# Patient Record
Sex: FEMALE | Race: WHITE | HISPANIC OR LATINO
[De-identification: names, ages, dates, MRNs, and addresses within clinical notes are randomized per-mention and may not be internally consistent; named-entity substitution may affect disease eponyms.]

---

## 2018-02-26 ENCOUNTER — LABORATORY RESULT (OUTPATIENT)
Age: 28
End: 2018-02-26

## 2018-02-27 ENCOUNTER — APPOINTMENT (OUTPATIENT)
Dept: OBGYN | Facility: CLINIC | Age: 28
End: 2018-02-27
Payer: MEDICAID

## 2018-02-27 ENCOUNTER — ASOB RESULT (OUTPATIENT)
Age: 28
End: 2018-02-27

## 2018-02-27 VITALS — DIASTOLIC BLOOD PRESSURE: 70 MMHG | WEIGHT: 151 LBS | SYSTOLIC BLOOD PRESSURE: 110 MMHG

## 2018-02-27 PROCEDURE — 99395 PREV VISIT EST AGE 18-39: CPT

## 2018-02-27 PROCEDURE — 76830 TRANSVAGINAL US NON-OB: CPT

## 2020-10-13 ENCOUNTER — LABORATORY RESULT (OUTPATIENT)
Age: 30
End: 2020-10-13

## 2020-10-14 ENCOUNTER — APPOINTMENT (OUTPATIENT)
Dept: OBGYN | Facility: CLINIC | Age: 30
End: 2020-10-14
Payer: COMMERCIAL

## 2020-10-14 VITALS — SYSTOLIC BLOOD PRESSURE: 110 MMHG | DIASTOLIC BLOOD PRESSURE: 70 MMHG | WEIGHT: 134 LBS

## 2020-10-14 PROCEDURE — 99395 PREV VISIT EST AGE 18-39: CPT

## 2020-10-14 NOTE — PHYSICAL EXAM
[Appropriately responsive] : appropriately responsive [Alert] : alert [No Acute Distress] : no acute distress [Regular Rate Rhythm] : regular rate rhythm [No Lymphadenopathy] : no lymphadenopathy [No Murmurs] : no murmurs [Clear to Auscultation B/L] : clear to auscultation bilaterally [Non-tender] : non-tender [Soft] : soft [No HSM] : No HSM [Non-distended] : non-distended [No Mass] : no mass [No Lesions] : no lesions [Oriented x3] : oriented x3 [Examination Of The Breasts] : a normal appearance [No Masses] : no breast masses were palpable [Labia Majora] : normal [Labia Minora] : normal [Uterine Adnexae] : normal [Normal] : normal

## 2020-10-14 NOTE — HISTORY OF PRESENT ILLNESS
[TextBox_4] : Patient is a 29 year old female who presents for her annual exam.  Reports regular menses, one partner, no abdominal or pelvic pain, change in discharge, change in bowel or bladder habits or any other concerns.  Due for pap.

## 2020-10-14 NOTE — COUNSELING
[Nutrition/ Exercise/ Weight Management] : nutrition, exercise, weight management [Vitamins/Supplements] : vitamins/supplements [Body Image] : body image [Bladder Hygiene] : bladder hygiene [Breast Self Exam] : breast self exam [Vaccines] : vaccines [STD (testing, results, tx)] : STD (testing, results, tx)

## 2020-10-15 ENCOUNTER — APPOINTMENT (OUTPATIENT)
Dept: OBGYN | Facility: CLINIC | Age: 30
End: 2020-10-15
Payer: COMMERCIAL

## 2020-10-15 VITALS — DIASTOLIC BLOOD PRESSURE: 70 MMHG | WEIGHT: 134 LBS | SYSTOLIC BLOOD PRESSURE: 110 MMHG

## 2020-10-15 DIAGNOSIS — Z01.419 ENCOUNTER FOR GYNECOLOGICAL EXAMINATION (GENERAL) (ROUTINE) W/OUT ABNORMAL FINDINGS: ICD-10-CM

## 2020-10-15 PROCEDURE — 99212 OFFICE O/P EST SF 10 MIN: CPT

## 2021-10-27 ENCOUNTER — APPOINTMENT (OUTPATIENT)
Dept: OBGYN | Facility: CLINIC | Age: 31
End: 2021-10-27
Payer: COMMERCIAL

## 2021-10-27 VITALS — WEIGHT: 132 LBS | SYSTOLIC BLOOD PRESSURE: 136 MMHG | DIASTOLIC BLOOD PRESSURE: 94 MMHG | HEART RATE: 89 BPM

## 2021-10-27 PROCEDURE — 99395 PREV VISIT EST AGE 18-39: CPT

## 2021-10-27 NOTE — COUNSELING
Call to patient and apologized as it appears it was done in error. Sent new prescriptions for 90 day supply. Patient verbalized understanding and denied further questions or concerns at this time.    [Nutrition/ Exercise/ Weight Management] : nutrition, exercise, weight management [Body Image] : body image [Vitamins/Supplements] : vitamins/supplements [Breast Self Exam] : breast self exam [Bladder Hygiene] : bladder hygiene [Contraception/ Emergency Contraception/ Safe Sexual Practices] : contraception, emergency contraception, safe sexual practices [STD (testing, results, tx)] : STD (testing, results, tx) [Vaccines] : vaccines

## 2021-11-02 LAB — CYTOLOGY CVX/VAG DOC THIN PREP: NORMAL

## 2022-10-26 ENCOUNTER — APPOINTMENT (OUTPATIENT)
Dept: OBGYN | Facility: CLINIC | Age: 32
End: 2022-10-26

## 2025-07-02 NOTE — HISTORY OF PRESENT ILLNESS
The order has been signed   [FreeTextEntry1] : Patient is a 30 year old female who presents for her annual exam.  Reports normal menses, one partner, no abdominal or pelvic pain, change in discharge, change in bowel or bladder habits or any other concerns.  Due for pap.  + sexually active, using condoms.